# Patient Record
Sex: FEMALE | Race: WHITE | NOT HISPANIC OR LATINO | Employment: PART TIME | URBAN - METROPOLITAN AREA
[De-identification: names, ages, dates, MRNs, and addresses within clinical notes are randomized per-mention and may not be internally consistent; named-entity substitution may affect disease eponyms.]

---

## 2023-12-13 ENCOUNTER — HOSPITAL ENCOUNTER (EMERGENCY)
Facility: CLINIC | Age: 43
Discharge: HOME | End: 2023-12-13
Attending: EMERGENCY MEDICINE | Admitting: EMERGENCY MEDICINE
Payer: COMMERCIAL

## 2023-12-13 VITALS
SYSTOLIC BLOOD PRESSURE: 125 MMHG | HEART RATE: 74 BPM | OXYGEN SATURATION: 98 % | DIASTOLIC BLOOD PRESSURE: 77 MMHG | RESPIRATION RATE: 18 BRPM

## 2023-12-13 DIAGNOSIS — R55 SYNCOPE: ICD-10-CM

## 2023-12-13 DIAGNOSIS — S01.81XA CHIN LACERATION, INITIAL ENCOUNTER: ICD-10-CM

## 2023-12-13 LAB
ANION GAP SERPL CALCULATED.3IONS-SCNC: 9 MMOL/L (ref 7–15)
BASOPHILS # BLD AUTO: 0 10E3/UL (ref 0–0.2)
BASOPHILS NFR BLD AUTO: 0 %
BUN SERPL-MCNC: 18 MG/DL (ref 6–20)
CALCIUM SERPL-MCNC: 9.5 MG/DL (ref 8.6–10)
CHLORIDE SERPL-SCNC: 99 MMOL/L (ref 98–107)
CREAT SERPL-MCNC: 0.94 MG/DL (ref 0.51–0.95)
DEPRECATED HCO3 PLAS-SCNC: 28 MMOL/L (ref 22–29)
EGFRCR SERPLBLD CKD-EPI 2021: 77 ML/MIN/1.73M2
EOSINOPHIL # BLD AUTO: 0.1 10E3/UL (ref 0–0.7)
EOSINOPHIL NFR BLD AUTO: 1 %
ERYTHROCYTE [DISTWIDTH] IN BLOOD BY AUTOMATED COUNT: 12.7 % (ref 10–15)
GLUCOSE SERPL-MCNC: 94 MG/DL (ref 70–99)
HCG SERPL QL: NEGATIVE
HCT VFR BLD AUTO: 41.2 % (ref 35–47)
HGB BLD-MCNC: 13.6 G/DL (ref 11.7–15.7)
HOLD SPECIMEN: NORMAL
IMM GRANULOCYTES # BLD: 0.1 10E3/UL
IMM GRANULOCYTES NFR BLD: 1 %
LYMPHOCYTES # BLD AUTO: 1.1 10E3/UL (ref 0.8–5.3)
LYMPHOCYTES NFR BLD AUTO: 11 %
MAGNESIUM SERPL-MCNC: 2.1 MG/DL (ref 1.7–2.3)
MCH RBC QN AUTO: 31.8 PG (ref 26.5–33)
MCHC RBC AUTO-ENTMCNC: 33 G/DL (ref 31.5–36.5)
MCV RBC AUTO: 96 FL (ref 78–100)
MONOCYTES # BLD AUTO: 0.6 10E3/UL (ref 0–1.3)
MONOCYTES NFR BLD AUTO: 6 %
NEUTROPHILS # BLD AUTO: 8.5 10E3/UL (ref 1.6–8.3)
NEUTROPHILS NFR BLD AUTO: 81 %
NRBC # BLD AUTO: 0 10E3/UL
NRBC BLD AUTO-RTO: 0 /100
PLATELET # BLD AUTO: 333 10E3/UL (ref 150–450)
POTASSIUM SERPL-SCNC: 4.2 MMOL/L (ref 3.4–5.3)
RBC # BLD AUTO: 4.28 10E6/UL (ref 3.8–5.2)
SODIUM SERPL-SCNC: 136 MMOL/L (ref 135–145)
WBC # BLD AUTO: 10.3 10E3/UL (ref 4–11)

## 2023-12-13 PROCEDURE — 12011 RPR F/E/E/N/L/M 2.5 CM/<: CPT | Performed by: EMERGENCY MEDICINE

## 2023-12-13 PROCEDURE — 258N000003 HC RX IP 258 OP 636

## 2023-12-13 PROCEDURE — 99284 EMERGENCY DEPT VISIT MOD MDM: CPT | Mod: 25 | Performed by: EMERGENCY MEDICINE

## 2023-12-13 PROCEDURE — 96360 HYDRATION IV INFUSION INIT: CPT | Performed by: EMERGENCY MEDICINE

## 2023-12-13 PROCEDURE — 84703 CHORIONIC GONADOTROPIN ASSAY: CPT

## 2023-12-13 PROCEDURE — 36415 COLL VENOUS BLD VENIPUNCTURE: CPT

## 2023-12-13 PROCEDURE — 82310 ASSAY OF CALCIUM: CPT

## 2023-12-13 PROCEDURE — 93005 ELECTROCARDIOGRAM TRACING: CPT | Mod: RTG,XU

## 2023-12-13 PROCEDURE — 250N000013 HC RX MED GY IP 250 OP 250 PS 637

## 2023-12-13 PROCEDURE — 83735 ASSAY OF MAGNESIUM: CPT

## 2023-12-13 PROCEDURE — 85025 COMPLETE CBC W/AUTO DIFF WBC: CPT

## 2023-12-13 RX ORDER — ACETAMINOPHEN 325 MG/1
975 TABLET ORAL ONCE
Status: COMPLETED | OUTPATIENT
Start: 2023-12-13 | End: 2023-12-13

## 2023-12-13 RX ADMIN — ACETAMINOPHEN 975 MG: 325 TABLET, FILM COATED ORAL at 13:03

## 2023-12-13 RX ADMIN — DEXTROSE AND SODIUM CHLORIDE: 5; 900 INJECTION, SOLUTION INTRAVENOUS at 12:59

## 2023-12-13 ASSESSMENT — ACTIVITIES OF DAILY LIVING (ADL)
ADLS_ACUITY_SCORE: 35
ADLS_ACUITY_SCORE: 35

## 2023-12-13 NOTE — DISCHARGE INSTRUCTIONS
You were seen in the ED on 12/13/23 for facial lacerations from a fall. The fall was likely due to poor oral intake causing low blood pressure. Your workup was negative for other concerning causes. Please follow up with your primary care doctor in 1 week as needed. Do not hesitate to return to the ED if you experience worsening symptoms.

## 2023-12-13 NOTE — ED TRIAGE NOTES
Patient fell with lac on chin and tongue       Triage Assessment (Adult)       Row Name 12/13/23 105          Triage Assessment    Airway WDL WDL        Respiratory WDL    Respiratory WDL WDL        Skin Circulation/Temperature WDL    Skin Circulation/Temperature WDL WDL        Cardiac WDL    Cardiac WDL WDL        Peripheral/Neurovascular WDL    Peripheral Neurovascular WDL WDL        Cognitive/Neuro/Behavioral WDL    Cognitive/Neuro/Behavioral WDL WDL

## 2023-12-13 NOTE — ED PROVIDER NOTES
"ED Provider Note  [facility]      History     Chief Complaint   Patient presents with    Laceration     Son was here for surgery.  She Fainted and fell on his bed.  May have lost consciousness.  Lac on chin and possibly tongue      HPI  Valery Pizano is a 43 year old female who presents with facial laceration after a fall. Patient was visiting her son in the pre-op unit upstairs. She felt lightheaded when the surgeon was discussing the procedure then lost consciousness and woke up on the floor.  did not witness the fall as she was behind him. He believes she hit something near the bottom of the hospital bed. Notably, patient started a \"detox\" yesterday and has had limited food and fluid intake. Did not have breakfast this morning. No history of similar symptoms. No history of seizures, heart conditions, or DM. Has a history of depression but denies issues with anxiety. No chest pain, dyspnea, abdominal pain, nausea, vomiting, LE swelling/pain, or other concerns at this time.    Past Medical History  Depression    No past medical history on file.  No past surgical history on file.  No current outpatient medications on file.    No Known Allergies    Family History  No family history on file.    Social History         A medically appropriate review of systems was performed with pertinent positives and negatives noted in the HPI, and all other systems negative.    Physical Exam     BP: 93/58  Pulse: 69  Resp: 16  SpO2: 100 %    Physical Exam  General: Patient is awake, lying down on the bed.  HEENT: There is a 1 cm superficial laceration on the inner bottom lip. No trauma to the tongue or loose teeth. There is also a small gaping laceration below the chin. Otherwise, head is atraumatic and normocephalic. No tenderness to palpation.  Cardiovascular: Regular rate and rhythm. No gallops, rubs, or murmurs.  Respiratory: CTAB. No wheezes.  Abdomen: Abdomen is soft and non-distended. No " tenderness to palpation.  MSK: Moving all 4 extremities. No LE edema or pain.  Skin: Warm to touch. Lacerations as noted above. No rashes.  Neurological: Alerted and oriented. CN II-XII grossly intact. No focal deficits.  Psychiatric: Appropriate affect. Difficulty fully articulating due to lip and chin pain, but otherwise normal speech.    ED Course, Procedures, & Data      Procedures             Laceration Repair:      Performed by: Dr. Rodríguez Khan  Consent given by: Patient who states understanding of the procedure being performed after discussing the risks, benefits and alternatives.    Preparation: Patient was prepped and draped in usual sterile fashion.  Irrigation solution: Saline    Body area: Chin  Laceration length: ~1 cm  Contamination: The wound is not contaminated.  Foreign bodies: None  Tendon involvement: None  Anesthesia: None    Debridement: None  Skin closure: Closed with Dermabond  Technique: Wound adhesive  Approximation: Close  Approximation difficulty: Simple    Patient tolerance: Patient tolerated the procedure well with no immediate complications.        Results for orders placed or performed during the hospital encounter of 12/13/23   Magnesium     Status: Normal   Result Value Ref Range    Magnesium 2.1 1.7 - 2.3 mg/dL   Basic metabolic panel     Status: Normal   Result Value Ref Range    Sodium 136 135 - 145 mmol/L    Potassium 4.2 3.4 - 5.3 mmol/L    Chloride 99 98 - 107 mmol/L    Carbon Dioxide (CO2) 28 22 - 29 mmol/L    Anion Gap 9 7 - 15 mmol/L    Urea Nitrogen 18.0 6.0 - 20.0 mg/dL    Creatinine 0.94 0.51 - 0.95 mg/dL    GFR Estimate 77 >60 mL/min/1.73m2    Calcium 9.5 8.6 - 10.0 mg/dL    Glucose 94 70 - 99 mg/dL   HCG qualitative Blood     Status: Normal   Result Value Ref Range    hCG Serum Qualitative Negative Negative   CBC with platelets and differential     Status: Abnormal   Result Value Ref Range    WBC Count 10.3 4.0 - 11.0 10e3/uL    RBC Count 4.28 3.80 - 5.20 10e6/uL     Hemoglobin 13.6 11.7 - 15.7 g/dL    Hematocrit 41.2 35.0 - 47.0 %    MCV 96 78 - 100 fL    MCH 31.8 26.5 - 33.0 pg    MCHC 33.0 31.5 - 36.5 g/dL    RDW 12.7 10.0 - 15.0 %    Platelet Count 333 150 - 450 10e3/uL    % Neutrophils 81 %    % Lymphocytes 11 %    % Monocytes 6 %    % Eosinophils 1 %    % Basophils 0 %    % Immature Granulocytes 1 %    NRBCs per 100 WBC 0 <1 /100    Absolute Neutrophils 8.5 (H) 1.6 - 8.3 10e3/uL    Absolute Lymphocytes 1.1 0.8 - 5.3 10e3/uL    Absolute Monocytes 0.6 0.0 - 1.3 10e3/uL    Absolute Eosinophils 0.1 0.0 - 0.7 10e3/uL    Absolute Basophils 0.0 0.0 - 0.2 10e3/uL    Absolute Immature Granulocytes 0.1 <=0.4 10e3/uL    Absolute NRBCs 0.0 10e3/uL   Extra Tube ([place] Draw)     Status: None    Narrative    The following orders were created for panel order Extra Tube ([place] Draw).  Procedure                               Abnormality         Status                     ---------                               -----------         ------                     Extra Blue Top Tube[[id number]]                              Final result                 Please view results for these tests on the individual orders.   Extra Blue Top Tube     Status: None   Result Value Ref Range    Hold Specimen hold    CBC with platelets differential     Status: Abnormal    Narrative    The following orders were created for panel order CBC with platelets differential.  Procedure                               Abnormality         Status                     ---------                               -----------         ------                     CBC with platelets and d...[[id number]]  Abnormal            Final result                 Please view results for these tests on the individual orders.     Medications   acetaminophen (TYLENOL) tablet 975 mg (975 mg Oral $Given 12/13/23 1303)     Labs Ordered and Resulted from Time of ED Arrival to Time of ED Departure   CBC WITH PLATELETS AND DIFFERENTIAL - Abnormal        Result Value    WBC Count 10.3      RBC Count 4.28      Hemoglobin 13.6      Hematocrit 41.2      MCV 96      MCH 31.8      MCHC 33.0      RDW 12.7      Platelet Count 333      % Neutrophils 81      % Lymphocytes 11      % Monocytes 6      % Eosinophils 1      % Basophils 0      % Immature Granulocytes 1      NRBCs per 100 WBC 0      Absolute Neutrophils 8.5 (*)     Absolute Lymphocytes 1.1      Absolute Monocytes 0.6      Absolute Eosinophils 0.1      Absolute Basophils 0.0      Absolute Immature Granulocytes 0.1      Absolute NRBCs 0.0     MAGNESIUM - Normal    Magnesium 2.1     BASIC METABOLIC PANEL - Normal    Sodium 136      Potassium 4.2      Chloride 99      Carbon Dioxide (CO2) 28      Anion Gap 9      Urea Nitrogen 18.0      Creatinine 0.94      GFR Estimate 77      Calcium 9.5      Glucose 94     HCG QUALITATIVE PREGNANCY - Normal    hCG Serum Qualitative Negative       No orders to display        Critical care was not performed.     Assessment & Plan    Valery is a 43 y.o. female here with facial lacerations after an unwitnessed fall. Vital signs are notable for soft BP on arrival, otherwise wnl. CBC and electrolytes unremarkable. HCG negative. EKG unremarkable. Differential including but not limited to cardiac event, seizure, hypoglycemia, and anemia were considered. Given the history and negative workup, it appears that patient had a possible vasovagal syncope during a stressful event. Reduced PO intake the past few days is also contributory. IV fluids administered with improvement in BP. The laceration on the chin was repaired with Dermabond, see procedure note above. Patient is stable to discharge with close outpatient follow up.    I have reviewed the nursing notes. I have reviewed the findings, diagnosis, plan and need for follow up with the patient.    There are no discharge medications for this patient.    Final diagnoses:   Chin laceration, initial encounter   Syncope     Finoa Victor,  "MD  PGY-1 Resident Physician    [facility] EMERGENCY DEPARTMENT  12/13/2023     Fiona Victor MD  Resident  12/13/23 4441    ED Attending Physician Attestation    I Rodríguez Khan DO, cared for this patient with the Resident. I have performed a history and physical examination of the patient and discussed management with the resident. I reviewed the resident's documentation above and agree with the documented findings and plan of care.    Summary of HPI, PE, ED Course   Patient  evaluated in the emergency department for syncope and a chin laceration.  EKG and labs are unremarkable.  Patient was in a somewhat high stress situation as her son was preparing for surgery.  She has had poor oral intake for the last 2 days due to a \"detox \"she is undergoing.  She did feel somewhat better after IV fluid.  Her chin laceration was closed with Dermabond.    Critical Care & Procedures  Not applicable.        Medical Decision Making  The patient's presentation is strongly suggestive of moderate complexity (an acute complicated injury).    The patient's evaluation involved:  review of external note(s) from 3+ sources (see separate area of note for details)  ordering and/or review of 3+ test(s) in this encounter (see separate area of note for details)  review of 3+ test result(s) ordered prior to this encounter (see separate area of note for details)    The patient's management involved moderate risk (a decision regarding minor procedure (laceration repair) with risk factors of none).      Rodríguez Khan DO  Emergency Medicine          Rodríguez Khan DO  12/13/23 4943    "

## 2023-12-14 LAB
ATRIAL RATE - MUSE: 59 BPM
DIASTOLIC BLOOD PRESSURE - MUSE: NORMAL MMHG
INTERPRETATION ECG - MUSE: NORMAL
P AXIS - MUSE: 55 DEGREES
PR INTERVAL - MUSE: 176 MS
QRS DURATION - MUSE: 86 MS
QT - MUSE: 470 MS
QTC - MUSE: 465 MS
R AXIS - MUSE: 38 DEGREES
SYSTOLIC BLOOD PRESSURE - MUSE: NORMAL MMHG
T AXIS - MUSE: 36 DEGREES
VENTRICULAR RATE- MUSE: 59 BPM

## 2024-07-28 ENCOUNTER — HEALTH MAINTENANCE LETTER (OUTPATIENT)
Age: 44
End: 2024-07-28

## 2025-07-20 ENCOUNTER — HEALTH MAINTENANCE LETTER (OUTPATIENT)
Age: 45
End: 2025-07-20

## 2025-08-10 ENCOUNTER — HEALTH MAINTENANCE LETTER (OUTPATIENT)
Age: 45
End: 2025-08-10